# Patient Record
Sex: FEMALE | Race: ASIAN | URBAN - METROPOLITAN AREA
[De-identification: names, ages, dates, MRNs, and addresses within clinical notes are randomized per-mention and may not be internally consistent; named-entity substitution may affect disease eponyms.]

---

## 2022-04-01 ENCOUNTER — OFFICE VISIT (OUTPATIENT)
Dept: URBAN - METROPOLITAN AREA CLINIC 10 | Facility: CLINIC | Age: 57
End: 2022-04-01
Payer: COMMERCIAL

## 2022-04-01 DIAGNOSIS — H43.312 VITREOUS MEMBRANES AND STRANDS, LEFT EYE: Primary | ICD-10-CM

## 2022-04-01 PROCEDURE — 92134 CPTRZ OPH DX IMG PST SGM RTA: CPT | Performed by: OPTOMETRIST

## 2022-04-01 PROCEDURE — 92133 CPTRZD OPH DX IMG PST SGM ON: CPT | Performed by: OPTOMETRIST

## 2022-04-01 PROCEDURE — 99204 OFFICE O/P NEW MOD 45 MIN: CPT | Performed by: OPTOMETRIST

## 2022-04-01 ASSESSMENT — INTRAOCULAR PRESSURE
OD: 19
OS: 20

## 2022-04-01 NOTE — IMPRESSION/PLAN
Impression: Vitreous membranes and strands, left eye: H43.312. Plan: Discussed findings in detail. Discussed signs and symptoms of RD and RTC STAT if noticed. Acute hemorrhagic PVD OS. No retinal break. RTC 1 month for repeat DFE c retina.

## 2022-05-17 ENCOUNTER — OFFICE VISIT (OUTPATIENT)
Dept: URBAN - METROPOLITAN AREA CLINIC 10 | Facility: CLINIC | Age: 57
End: 2022-05-17
Payer: COMMERCIAL

## 2022-05-17 DIAGNOSIS — H43.812 VITREOUS DEGENERATION, LEFT EYE: ICD-10-CM

## 2022-05-17 DIAGNOSIS — H43.312 VITREOUS MEMBRANES AND STRANDS, LEFT EYE: Primary | ICD-10-CM

## 2022-05-17 PROCEDURE — 92134 CPTRZ OPH DX IMG PST SGM RTA: CPT | Performed by: OPHTHALMOLOGY

## 2022-05-17 PROCEDURE — 99204 OFFICE O/P NEW MOD 45 MIN: CPT | Performed by: OPHTHALMOLOGY

## 2022-05-17 ASSESSMENT — INTRAOCULAR PRESSURE
OS: 12
OD: 12

## 2022-05-17 NOTE — IMPRESSION/PLAN
Impression: Vitreous degeneration, left Plan: See other plan   - Mild NSC as well.   F/u [facility]

## 2022-05-17 NOTE — IMPRESSION/PLAN
Impression: Vitreous membranes and strands, left Plan: Acute hemorrhagic PVD OS. BP was high and went to ER. Denies h/o anemia or other blood dsycrasia -- w/u in the ER was reportedly negative. Will request notes. TODAY confirmed PVd w HMG clearing -- NO TEAR / RD --  Confirmed posterior vitreous detachment (PVD). Examination of the retinal periphery with specialty scleral depression shows no tear / hole/ detachment. Signs and symptoms of retinal tear / detachment reviewed with the pt. The pt will return again immediately if any such symptoms. Tolerate floaters. Should improve. F/u Higinio 4m.   RETINA PRN

## 2022-11-22 ENCOUNTER — OFFICE VISIT (OUTPATIENT)
Dept: URBAN - METROPOLITAN AREA CLINIC 10 | Facility: CLINIC | Age: 57
End: 2022-11-22
Payer: COMMERCIAL

## 2022-11-22 DIAGNOSIS — H43.312 VITREOUS MEMBRANES AND STRANDS, LEFT EYE: ICD-10-CM

## 2022-11-22 DIAGNOSIS — H25.13 AGE-RELATED NUCLEAR CATARACT, BILATERAL: Primary | ICD-10-CM

## 2022-11-22 PROCEDURE — 99214 OFFICE O/P EST MOD 30 MIN: CPT | Performed by: OPTOMETRIST

## 2022-11-22 PROCEDURE — 92134 CPTRZ OPH DX IMG PST SGM RTA: CPT | Performed by: OPTOMETRIST

## 2022-11-22 ASSESSMENT — VISUAL ACUITY
OD: 20/20
OS: 20/20

## 2022-11-22 ASSESSMENT — INTRAOCULAR PRESSURE
OD: 10
OS: 10

## 2022-11-22 NOTE — IMPRESSION/PLAN
Impression: Vitreous membranes and strands, left eye: H43.312. Plan: Discussed findings in detail. Discussed signs and symptoms of RD and RTC STAT if noticed. Acute hemorrhagic PVD OS. Resolved. No retina break. Seen by retina, Dr. Brittney Wilson as well. Now stable. Monitor in 1 year for complete and order Mac OCT.

## 2024-01-03 ENCOUNTER — OFFICE VISIT (OUTPATIENT)
Dept: URBAN - METROPOLITAN AREA CLINIC 56 | Facility: LOCATION | Age: 59
End: 2024-01-03
Payer: COMMERCIAL

## 2024-01-03 DIAGNOSIS — H43.813 VITREOUS DEGENERATION, BILATERAL: Primary | ICD-10-CM

## 2024-01-03 DIAGNOSIS — H25.13 AGE-RELATED NUCLEAR CATARACT, BILATERAL: ICD-10-CM

## 2024-01-03 PROCEDURE — 92014 COMPRE OPH EXAM EST PT 1/>: CPT | Performed by: STUDENT IN AN ORGANIZED HEALTH CARE EDUCATION/TRAINING PROGRAM

## 2024-01-03 ASSESSMENT — INTRAOCULAR PRESSURE
OD: 13
OS: 13

## 2024-01-03 ASSESSMENT — KERATOMETRY
OS: 43.73
OD: 43.87

## 2024-01-03 ASSESSMENT — VISUAL ACUITY
OS: 20/20
OD: 20/20